# Patient Record
Sex: MALE
[De-identification: names, ages, dates, MRNs, and addresses within clinical notes are randomized per-mention and may not be internally consistent; named-entity substitution may affect disease eponyms.]

---

## 2017-09-01 ENCOUNTER — HOSPITAL ENCOUNTER (EMERGENCY)
Dept: HOSPITAL 46 - ED | Age: 42
Discharge: HOME | End: 2017-09-01
Payer: COMMERCIAL

## 2017-09-01 VITALS — BODY MASS INDEX: 32.14 KG/M2 | WEIGHT: 200 LBS | HEIGHT: 66 IN

## 2017-09-01 DIAGNOSIS — F17.200: ICD-10-CM

## 2017-09-01 DIAGNOSIS — E78.5: ICD-10-CM

## 2017-09-01 DIAGNOSIS — R10.11: Primary | ICD-10-CM

## 2017-09-02 ENCOUNTER — HOSPITAL ENCOUNTER (EMERGENCY)
Dept: HOSPITAL 46 - ED | Age: 42
Discharge: HOME | End: 2017-09-02
Payer: COMMERCIAL

## 2017-09-02 VITALS — WEIGHT: 200 LBS | HEIGHT: 66 IN | BODY MASS INDEX: 32.14 KG/M2

## 2017-09-02 DIAGNOSIS — E78.5: ICD-10-CM

## 2017-09-02 DIAGNOSIS — R10.11: Primary | ICD-10-CM

## 2017-09-02 DIAGNOSIS — F17.200: ICD-10-CM

## 2019-07-05 ENCOUNTER — HOSPITAL ENCOUNTER (EMERGENCY)
Dept: HOSPITAL 46 - ED | Age: 44
Discharge: HOME | End: 2019-07-05
Payer: COMMERCIAL

## 2019-07-05 VITALS — BODY MASS INDEX: 32.14 KG/M2 | HEIGHT: 66 IN | WEIGHT: 200 LBS

## 2019-07-05 DIAGNOSIS — R10.32: ICD-10-CM

## 2019-07-05 DIAGNOSIS — F17.200: ICD-10-CM

## 2019-07-05 DIAGNOSIS — R10.31: Primary | ICD-10-CM

## 2019-07-05 NOTE — XMS
PreManage Notification: SANDRA PENDLETON MRN:[id number]
 
Security Information
 
Security Events
No recent Security Events currently on file
 
 
 
CRITERIA MET
------------
- Group Notification
 
 
CARE PROVIDERS
-------------------------------------------------------------------------------------
BRIGITTE RAMOS     Physician Assistant     Current
 
PHONE: Unknown
-------------------------------------------------------------------------------------
 
Eduardo has no Care Guidelines for this patient.
 
EMAYELA VISIT COUNT (12 MO.)
-------------------------------------------------------------------------------------
1 SHARONDA Beal
-------------------------------------------------------------------------------------
TOTAL 1
-------------------------------------------------------------------------------------
NOTE: Visits indicate total known visits.
 
ED/UCC VISIT TRACKING (12 MO.)
-------------------------------------------------------------------------------------
07/05/2019 10:12
[facility]ON[facility] OR
 
TYPE: Emergency
 
COMPLAINT:
- LOW BACK PAIN,LOWER ABD PAIN
-------------------------------------------------------------------------------------
 
 
INPATIENT VISIT TRACKING (12 MO.)
No inpatient visits to display in this time frame
 
[email or web address]